# Patient Record
Sex: MALE | Race: BLACK OR AFRICAN AMERICAN | NOT HISPANIC OR LATINO | Employment: OTHER | URBAN - METROPOLITAN AREA
[De-identification: names, ages, dates, MRNs, and addresses within clinical notes are randomized per-mention and may not be internally consistent; named-entity substitution may affect disease eponyms.]

---

## 2019-12-12 PROBLEM — R07.9 CHEST PAIN: Status: ACTIVE | Noted: 2019-12-12

## 2020-10-14 PROBLEM — I25.10 CORONARY ARTERY DISEASE: Status: ACTIVE | Noted: 2019-03-12

## 2020-10-14 PROBLEM — R09.89 RHONCHI: Status: ACTIVE | Noted: 2020-10-14

## 2020-10-14 PROBLEM — B96.29 UTI DUE TO EXTENDED-SPECTRUM BETA LACTAMASE (ESBL) PRODUCING ESCHERICHIA COLI: Status: ACTIVE | Noted: 2020-10-14

## 2020-10-14 PROBLEM — I10 HYPERTENSION: Status: ACTIVE | Noted: 2019-03-27

## 2020-10-14 PROBLEM — K21.9 GASTROESOPHAGEAL REFLUX DISEASE: Status: ACTIVE | Noted: 2020-10-14

## 2020-10-14 PROBLEM — E78.49 OTHER HYPERLIPIDEMIA: Status: ACTIVE | Noted: 2019-03-27

## 2020-10-14 PROBLEM — I20.0 UNSTABLE ANGINA: Status: ACTIVE | Noted: 2019-03-27

## 2020-10-14 PROBLEM — I21.4 NSTEMI (NON-ST ELEVATED MYOCARDIAL INFARCTION): Status: ACTIVE | Noted: 2019-03-12

## 2020-10-14 PROBLEM — I77.89 ECTASIA OF ARTERY: Status: ACTIVE | Noted: 2019-04-05

## 2020-10-14 PROBLEM — M19.90 ARTHRITIS: Status: ACTIVE | Noted: 2019-03-12

## 2020-10-14 PROBLEM — Z16.12 UTI DUE TO EXTENDED-SPECTRUM BETA LACTAMASE (ESBL) PRODUCING ESCHERICHIA COLI: Status: ACTIVE | Noted: 2020-10-14

## 2020-10-14 PROBLEM — R06.09 DOE (DYSPNEA ON EXERTION): Status: ACTIVE | Noted: 2019-03-27

## 2020-10-14 PROBLEM — N39.0 UTI DUE TO EXTENDED-SPECTRUM BETA LACTAMASE (ESBL) PRODUCING ESCHERICHIA COLI: Status: ACTIVE | Noted: 2020-10-14

## 2020-10-20 PROBLEM — N30.00 ACUTE CYSTITIS WITHOUT HEMATURIA: Status: ACTIVE | Noted: 2020-10-20

## 2020-10-26 PROBLEM — E87.6 HYPOKALEMIA: Status: ACTIVE | Noted: 2020-10-26

## 2021-05-17 PROBLEM — Z23 IMMUNIZATION DUE: Status: ACTIVE | Noted: 2021-05-17

## 2021-05-17 PROBLEM — R51.9 NONINTRACTABLE HEADACHE: Status: ACTIVE | Noted: 2021-05-17

## 2021-09-06 PROBLEM — I24.9 ACS (ACUTE CORONARY SYNDROME): Status: ACTIVE | Noted: 2021-09-06

## 2023-01-07 PROBLEM — R06.02 SOB (SHORTNESS OF BREATH): Status: ACTIVE | Noted: 2019-03-27

## 2023-01-07 PROBLEM — G47.33 OBSTRUCTIVE SLEEP APNEA (ADULT) (PEDIATRIC): Status: ACTIVE | Noted: 2023-01-07

## 2023-01-07 PROBLEM — K21.00 GASTROESOPHAGEAL REFLUX DISEASE WITH ESOPHAGITIS WITHOUT HEMORRHAGE: Status: ACTIVE | Noted: 2020-10-14

## 2023-01-07 PROBLEM — N40.0 BENIGN PROSTATIC HYPERPLASIA WITHOUT URINARY OBSTRUCTION: Status: ACTIVE | Noted: 2023-01-07

## 2023-01-07 PROBLEM — I24.9 ACS (ACUTE CORONARY SYNDROME): Status: RESOLVED | Noted: 2021-09-06 | Resolved: 2023-01-07

## 2023-01-07 PROBLEM — R07.9 CHEST PAIN: Status: RESOLVED | Noted: 2019-12-12 | Resolved: 2023-01-07

## 2023-01-07 PROBLEM — Z23 IMMUNIZATION DUE: Status: RESOLVED | Noted: 2021-05-17 | Resolved: 2023-01-07

## 2023-01-07 PROBLEM — I20.0 UNSTABLE ANGINA: Status: RESOLVED | Noted: 2019-03-27 | Resolved: 2023-01-07

## 2023-05-21 PROBLEM — F32.9 REACTIVE DEPRESSION: Status: ACTIVE | Noted: 2023-05-21

## 2023-05-21 PROBLEM — N40.1 BENIGN PROSTATIC HYPERPLASIA WITH URINARY FREQUENCY: Status: ACTIVE | Noted: 2023-05-21

## 2023-05-21 PROBLEM — I24.9 ACS (ACUTE CORONARY SYNDROME): Chronic | Status: ACTIVE | Noted: 2021-09-06

## 2023-05-21 PROBLEM — R56.9 SEIZURE: Status: ACTIVE | Noted: 2023-05-21

## 2023-05-21 PROBLEM — F03.90 DEMENTIA: Status: ACTIVE | Noted: 2023-05-21

## 2023-05-21 PROBLEM — F01.B3 MODERATE VASCULAR DEMENTIA WITH MOOD DISTURBANCE: Status: ACTIVE | Noted: 2023-05-21

## 2023-05-21 PROBLEM — R35.0 BENIGN PROSTATIC HYPERPLASIA WITH URINARY FREQUENCY: Status: ACTIVE | Noted: 2023-05-21
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
## 2023-05-24 PROBLEM — R94.01 ABNORMAL EEG: Status: ACTIVE | Noted: 2023-05-24

## 2023-05-25 ENCOUNTER — PATIENT OUTREACH (OUTPATIENT)
Dept: ADMINISTRATIVE | Facility: CLINIC | Age: 73
End: 2023-05-25

## 2023-05-25 NOTE — PROGRESS NOTES
C3 nurse attempted to contact Thai Jauregui for a TCC post hospital discharge follow up call. No answer. The patient does not have a scheduled HOSFU appointment, and the pt does not have an [facility] PCP.

## 2023-05-29 NOTE — PROGRESS NOTES
C3 nurse spoke with Thai Jauregui for a TCC post hospital discharge follow up call. The patient reports does not have a scheduled HOSFU appointment. C3 nurse was unable to schedule [facility] appointment [facility]. Patient advised to contact their PCP to schedule a HOSPFU within 5-7 days.

## 2023-09-23 PROBLEM — E78.5 HYPERLIPIDEMIA: Status: ACTIVE | Noted: 2019-03-27

## 2023-12-25 PROBLEM — I21.4 NSTEMI (NON-ST ELEVATED MYOCARDIAL INFARCTION): Status: RESOLVED | Noted: 2019-03-12 | Resolved: 2023-12-25
